# Patient Record
Sex: MALE | Race: WHITE | NOT HISPANIC OR LATINO | Employment: FULL TIME | URBAN - METROPOLITAN AREA
[De-identification: names, ages, dates, MRNs, and addresses within clinical notes are randomized per-mention and may not be internally consistent; named-entity substitution may affect disease eponyms.]

---

## 2018-02-16 ENCOUNTER — OCCUPATIONAL MEDICINE (OUTPATIENT)
Dept: OCCUPATIONAL MEDICINE | Facility: CLINIC | Age: 33
End: 2018-02-16
Payer: COMMERCIAL

## 2018-02-16 VITALS — BODY MASS INDEX: 37.25 KG/M2 | WEIGHT: 275 LBS | RESPIRATION RATE: 14 BRPM | HEIGHT: 72 IN

## 2018-02-16 DIAGNOSIS — S60.419A ABRASION OF MULTIPLE SITES OF RIGHT HAND AND FINGER, INITIAL ENCOUNTER: Primary | ICD-10-CM

## 2018-02-16 DIAGNOSIS — S60.511A ABRASION OF MULTIPLE SITES OF RIGHT HAND AND FINGER, INITIAL ENCOUNTER: Primary | ICD-10-CM

## 2018-02-16 PROCEDURE — 90471 IMMUNIZATION ADMIN: CPT | Performed by: PREVENTIVE MEDICINE

## 2018-02-16 PROCEDURE — 99203 OFFICE O/P NEW LOW 30 MIN: CPT | Mod: 25 | Performed by: PREVENTIVE MEDICINE

## 2018-02-16 PROCEDURE — 90715 TDAP VACCINE 7 YRS/> IM: CPT | Performed by: PREVENTIVE MEDICINE

## 2018-02-16 NOTE — PROGRESS NOTES
Subjective:      Benoit Chiu is a 32 y.o. male who presents with Other (WC DOI 02/16/2018 - Rt Hand - )      DOI 2/16/18: 31 yo male presents for right hand abrasion. He was pulling  door closed and and plastic panel slid against right hand causing deep abrasion to 3 fingers. Patient states there is some bleeding. He states there was a medic at work he wrapped his hand for him. He denies any numbness or tingling. He denies radiating pain. He notes stinging pain that each of the abrasion sites. Does not recall last tetanus vaccine but believes it's been at least 5 years.     HPI    ROS  ROS: All systems were reviewed on intake form, form was reviewed and signed. See scanned documents in media. Pertinent positives and negatives included in HPI.    PMH: No pertinent past medical history to this problem  MEDS: Medications were reviewed in Epic  ALLERGIES: Not on File  SOCHX: Works as a [employer]  at [employer]  FH: No pertinent family history to this problem     Objective:     There were no vitals taken for this visit.     Physical Exam   Constitutional: He is oriented to person, place, and time. He appears well-developed and well-nourished.   HENT:   Right Ear: External ear normal.   Left Ear: External ear normal.   Eyes: Conjunctivae and EOM are normal.   Cardiovascular: Normal rate.    Pulmonary/Chest: Effort normal. No respiratory distress.   Neurological: He is alert and oriented to person, place, and time.   Skin: Skin is warm and dry.   Psychiatric: He has a normal mood and affect. Judgment normal.       Right Hand: Multiple abrasion over the palmar aspect of the hand and digits 2 through 4. Minimal bleeding. No repairable laceration. Full range of motion of all digits and wrists. Sensation intact. Strength intact.       Assessment/Plan:     1. Abrasion of multiple sites of right hand and finger, initial encounter  - Tdap =>8yo IM  - Consent For Tetanus Booster    Clean abrasions of  normal saline. Applied antibiotic ointment. Wrapped with gauze.  Apply thinly ran antibiotic ointment one to 2 times daily. Recommend daily dressing changes. Can leave open to air if no chance of contamination  Keep wound clean, covered and dry at work  Okay for full duty  Follow-up Monday

## 2018-02-16 NOTE — LETTER
[address],   [address] [address]  Phone:  [phone] - Fax:  [phone]   [facility] Progress Report and Disability Certification  Date of Service: 2/16/2018   No Show:  No  Date / Time of Next Visit: 2/20/18 @ 1pm   Claim Information   Patient Name: Benoit Chiu  Claim Number:     Employer:  [employer] Date of Injury: 2/16/2018     Insurer / TPA: [employer] Workers Comp  ID / SSN:     Occupation: Hangar Maintenance  Diagnosis: The encounter diagnosis was Abrasion of multiple sites of right hand and finger, initial encounter.    Medical Information   Related to Industrial Injury? Yes    Subjective Complaints:  DOI 2/16/18: 33 yo male presents for right hand abrasion. He was pulling  door closed and and plastic panel slid against right hand causing deep abrasion to 3 fingers. Patient states there is some bleeding. He states there was a medic at work he wrapped his hand for him. He denies any numbness or tingling. He denies radiating pain. He notes stinging pain that each of the abrasion sites. Does not recall last tetanus vaccine but believes it's been at least 5 years.   Objective Findings: Right Hand: Multiple abrasion over the palmar aspect of the hand and digits 2 through 4. Minimal bleeding. No repairable laceration. Full range of motion of all digits and wrists. Sensation intact. Strength intact.   Pre-Existing Condition(s):     Assessment:   Initial Visit    Status: Additional Care Required  Permanent Disability:No    Plan:      Diagnostics:      Comments:  Clean abrasions of normal saline. Applied antibiotic ointment. Wrapped with gauze.  Apply thinly ran antibiotic ointment one to 2 times daily. Recommend daily dressing changes. Can leave open to air if no chance of contamination  Keep wound clean, co  harleen and dry at work  Okay for full duty  Follow-up Monday for wound check      Disability Information   Status:  Released to Full Duty    From:  2/16/2018  Through: 2/19/2018 Restrictions are:     Physical Restrictions   Sitting:    Standing:    Stooping:    Bending:      Squatting:    Walking:    Climbing:    Pushing:      Pulling:    Other:    Reaching Above Shoulder (L):   Reaching Above Shoulder (R):       Reaching Below Shoulder (L):    Reaching Below Shoulder (R):      Not to exceed Weight Limits   Carrying(hrs):   Weight Limit(lb):   Lifting(hrs):   Weight  Limit(lb):     Comments:      Repetitive Actions   Hands: i.e. Fine Manipulations from Grasping:     Feet: i.e. Operating Foot Controls:     Driving / Operate Machinery:     Physician Name: Rashaun Marcelino D.O. Physician Signature: RASHAUN Little D.O. e-Signature: Dr. Michael Mosqueda, Medical Director   Clinic Name / Location: [address],   [address] [place], [place] [address] [address] Clinic Phone Number: Dept: [phone]   Appointment Time: 3:15 Pm Visit Start Time: 9:56 AM   Check-In Time:  9:53 Am Visit Discharge Time:  10:48 am   Original-Treating Physician or Chiropractor    Page 2-Insurer/TPA    Page 3-Employer    Page 4-Employee

## 2018-02-16 NOTE — LETTER
EMPLOYEE’S CLAIM FOR COMPENSATION/ REPORT OF INITIAL TREATMENT  FORM C-4    EMPLOYEE’S CLAIM - PROVIDE ALL INFORMATION REQUESTED   First Name  Benoit Last Name  Aram Birthdate                    1985                Sex  male Claim Number   Home Address  [address]  Age  32 y.o. Height   Weight   SSN     [facility] Zip  [address] Telephone  [phone] (home)    Mailing Address  [address]  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 (if applicable)  Closing the Hangar Door    How did this injury or occupational disease occur? (Be specific an answer in detail. Use additional sheet if necessary)  I was pulling the hangar door closed, could not see where it dropped and kept pulling the plastic panel removed skin from 3 fingers on my right hand.   If you believe that you have an occupational disease, when did you first have knowledge of the disability and it relationship to your employment?  n/a Witnesses to the Accident  Shoaib Darrell      Nature of Injury or Occupational Disease  Defer  Part(s) of Body Injured or Affected  Hand (R), Finger (R), Defer    I certify that the above is true and correct to the best of my knowledge and that I  have provided this information in order to obtain the benefits of [place]  I hereby authorize any physician, chiropractor, surgeon, practitioner, or other person, any hospital, including [facility] hospital, any medical service organization, any insurance company, or other institution or organization to release to each other, any medical or other information, including benefits paid or payable, pertinent to this injury or disease, except information relative to diagnosis, treatment and/or counseling for AIDS, psychological conditions, alcohol or controlled substances, for which I must give specific authorization.  A Photostat of this authorization shall be as valid as the original.     Date   Place   Employee’s Signature   THIS REPORT MUST BE COMPLETED AND MAILED WITHIN 3 WORKING DAYS OF TREATMENT   Place  [facility]  Name of Facility  [facility]   Date  2/16/2018 Diagnosis  (S60.511A,  S60.419A) Abrasion of multiple sites of right hand and finger, initial encounter  (primary encounter diagnosis) Is there evidence the injured employee was under the influence of alcohol and/or another controlled substance at the time of accident?   Hour  9:56 AM Description of Injury or Disease  The encounter diagnosis was Abrasion of multiple sites of right hand and finger, initial encounter. No   Treatment  Antibiotic ointment and basic wound dressing.   Have you advised the patient to remain off work five days or more? No   X-Ray Findings      If Yes   From Date  To Date      From information given by the employee, together with medical evidence, can you directly connect this injury or occupational disease as job incurred?  Yes If No Full Duty  Yes Modified Duty      Is additional medical care by a physician indicated?  Yes If Modified Duty, Specify any Limitations /  "Restrictions      Do you know of any previous injury or disease contributing to this condition or occupational disease?                            No   Date  2/16/2018 Print Doctor’s Name Rashaun Marcelino D.O. I certify the employer’s copy of  this form was mailed on:   Address  [address] [place] [address],   [address] Insurer’s Use Only     [facility] Zip  [address]    Provider’s Tax ID Number  [id number] Telephone  Dept: [phone]        e-SignTAYLORRASHAUN D.O.   e-Signature: Dr. Michael Mosqueda, Medical Director Degree  DO        ORIGINAL-TREATING PHYSICIAN OR CHIROPRACTOR    PAGE 2-INSURER/TPA    PAGE 3-EMPLOYER    PAGE 4-EMPLOYEE             Form C-4 (rev10/07)              BRIEF DESCRIPTION OF RIGHTS AND BENEFITS  (Pursuant to [place]RS 616C.050)    Notice of Injury or Occupational Disease (Incident Report Form C-1): If an injury or occupational disease (OD) arises out of and in the  course of employment, you must provide written notice to your employer as soon as practicable, but no later than 7 days after the accident or  OD. Your employer shall maintain a sufficient supply of the required forms.    Claim for Compensation (Form C-4): If medical treatment is sought, the form C-4 is available at the place of initial treatment. A completed  \"Claim for Compensation\" (Form C-4) must be filed within 90 days after an accident or OD. The treating physician or chiropractor must,  within 3 working days after treatment, complete and mail to the employer, the employer's insurer and third-party , the Claim for  Compensation.    Medical Treatment: If you require medical treatment for your on-the-job injury or OD, you may be required to select a physician or  chiropractor from a list provided by your workers’ compensation insurer, if it has contracted with an Organization for Managed Care (MCO) or  Preferred Provider Organization (PPO) or providers of health care. If your employer has not entered into a contract with " an MCO or PPO, you  may select a physician or chiropractor from the Panel of Physicians and Chiropractors. Any medical costs related to your industrial injury or  OD will be paid by your insurer.    Temporary Total Disability (TTD): If your doctor has certified that you are unable to work for a period of at least 5 consecutive days, or 5  cumulative days in a 20-day period, or places restrictions on you that your employer does not accommodate, you may be entitled to TTD  compensation.    Temporary Partial Disability (TPD): If the wage you receive upon reemployment is less than the compensation for TTD to which you are  entitled, the insurer may be required to pay you TPD compensation to make up the difference. TPD can only be paid for a maximum of 24  months.    Permanent Partial Disability (PPD): When your medical condition is stable and there is an indication of a PPD as a result of your injury or  OD, within 30 days, your insurer must arrange for an evaluation by a rating physician or chiropractor to determine the degree of your PPD. The  amount of your PPD award depends on the date of injury, the results of the PPD evaluation and your age and wage.    Permanent Total Disability (PTD): If you are medically certified by a treating physician or chiropractor as permanently and totally disabled  and have been granted a PTD status by your insurer, you are entitled to receive monthly benefits not to exceed 66 2/3% of your average  monthly wage. The amount of your PTD payments is subject to reduction if you previously received a PPD award.    Vocational Rehabilitation Services: You may be eligible for vocational rehabilitation services if you are unable to return to the job due to a  permanent physical impairment or permanent restrictions as a result of your injury or occupational disease.    Transportation and Per Ernesto Reimbursement: You may be eligible for travel expenses and per ernesto associated with medical  treatment.    Reopening: You may be able to reopen your claim if your condition worsens after claim closure.    Appeal Process: If you disagree with a written determination issued by the insurer or the insurer does not respond to your request, you may  appeal to the Department of Administration, , by following the instructions contained in your determination letter. You must  appeal the determination within 70 days from the date of the determination letter at [address], [address], [place], [place]  [address], or [address], [address], [place], [place] [address]. If you disagree with the  decision, you may appeal to the  Department of Administration, . You must file your appeal within 30 days from the date of the  decision  letter at [address], [address], [place], [place] [address], or [address], [address], [place], [place] [address]. If you  disagree with a decision of an , you may file a petition for judicial review with the District Court. You must do so within 30  days of the Appeal Officer’s decision. You may be represented by an  at your own expense or you may contact the [facility] for possible  representation.    [place]  for Injured Workers (NAIW): If you disagree with a  decision, you may request that NAIW represent you  without charge at an  Hearing. For information regarding denial of benefits, you may contact the [facility] at: [address], [address], [place] [address], [phone], or [address], [address], [place] [address], [phone]    To File a Complaint with the Division: If you wish to file a complaint with the  of the Division of Industrial Relations (DIR),  please contact the Workers’ Compensation Section, [address], [address], [place], [place] [address], telephone [phone], or  [address], [address], [place], [place] [address], telephone [phone].    For assistance with Workers’ Compensation Issues: you may contact the Office of the Governor Consumer Health Assistance, [address] [place] [address], [address], [place], [place] [address], Toll Free 1-444.977.9797, Web site: http://[employer].[facility].nv., E-mail  [email or web address].[email or web address].                                                                                                                                                                                                                                   __________________________________________________________________                                                                   _________________                Employee Name / Signature                                                                                                                                                       Date                                                                                                                                                                                                     D-2 (rev. 10/07)

## 2018-02-20 ENCOUNTER — OCCUPATIONAL MEDICINE (OUTPATIENT)
Dept: OCCUPATIONAL MEDICINE | Facility: CLINIC | Age: 33
End: 2018-02-20
Payer: COMMERCIAL

## 2018-02-20 VITALS
RESPIRATION RATE: 16 BRPM | HEIGHT: 72 IN | TEMPERATURE: 98.2 F | OXYGEN SATURATION: 94 % | BODY MASS INDEX: 37.25 KG/M2 | WEIGHT: 275 LBS | HEART RATE: 75 BPM

## 2018-02-20 DIAGNOSIS — S60.419A ABRASION OF MULTIPLE SITES OF RIGHT HAND AND FINGER, INITIAL ENCOUNTER: ICD-10-CM

## 2018-02-20 DIAGNOSIS — S60.511A ABRASION OF MULTIPLE SITES OF RIGHT HAND AND FINGER, INITIAL ENCOUNTER: ICD-10-CM

## 2018-02-20 PROCEDURE — 99212 OFFICE O/P EST SF 10 MIN: CPT | Performed by: PREVENTIVE MEDICINE

## 2018-02-20 NOTE — LETTER
[address],   [address] [place] [address]  Phone:  [phone] - Fax:  [phone]   [facility] Progress Report and Disability Certification  Date of Service: 2/20/2018   No Show:  No  Date / Time of Next Visit:  Release from care   Claim Information   Patient Name: Benoit Chiu  Claim Number:     Employer:   [employer]  Date of Injury: 2/16/2018     Insurer / TPA: Meaghan Puri  ID / SSN:     Occupation: Hangar Maintenance  Diagnosis: The encounter diagnosis was Abrasion of multiple sites of right hand and finger, initial encounter.    Medical Information   Related to Industrial Injury? Yes    Subjective Complaints:  DOI 2/16/18: 31 yo male presents for right hand abrasion. He was pulling  door closed and and plastic panel slid against right hand causing deep abrasion to 3 fingers. Patient states the hand is improving. He notes that all the abrasions except for one have pretty much healed.. Notes minimal pain   Objective Findings: Right Hand: Multiple abrasion over the palmar aspect of the hand and digits 2 through 4. Well-healing. No erythema, swelling or drainage.   Pre-Existing Condition(s):     Assessment:   Condition Improved    Status: Discharged /  MMI  Permanent Disability:No    Plan:      Diagnostics:      Comments:  Keep wound clean until completely healed  Release from care  Full duty  Follow-up as needed    Disability Information   Status: Released to Full Duty    From:  2/20/2018  Through:   Restrictions are:     Physical Restrictions   Sitting:    Standing:    Stooping:    Bending:      Squatting:    Walking:    Climbing:    Pushing:      Pulling:    Other:    Reaching Above Shoulder (L):   Reaching Above Shoulder (R):       Reaching Below Shoulder (L):    Reaching Below Shoulder (R):      Not to exceed Weight Limits   Carrying(hrs):   Weight Limit(lb):   Lifting(hrs):   Weight  Limit(lb):     Comments:      Repetitive  Actions   Hands: i.e. Fine Manipulations from Grasping:     Feet: i.e. Operating Foot Controls:     Driving / Operate Machinery:     Physician Name: Rashaun Marcelino D.O. Physician Signature: RASHAUN Little D.O. e-Signature: Dr. Michael Mosqueda, Medical Director   Clinic Name / Location: [address],   [address]  [place] [address] Clinic Phone Number: Dept: [phone]   Appointment Time: 1:00 Pm Visit Start Time: 12:56 PM   Check-In Time:  12:54 Pm Visit Discharge Time:  1:15 PM    Original-Treating Physician or Chiropractor    Page 2-Insurer/TPA    Page 3-Employer    Page 4-Employee

## 2018-02-20 NOTE — PROGRESS NOTES
Subjective:      Benoit Chiu is a 32 y.o. male who presents with Follow-Up (WC DOI 02/16/2018 - Rt Hand - better - room 3)      DOI 2/16/18: 31 yo male presents for right hand abrasion. He was pulling  door closed and and plastic panel slid against right hand causing deep abrasion to 3 fingers. Patient states the hand is improving. He notes that all the abrasions except for one have pretty much healed.. Notes minimal pain     HPI    ROS        Objective:     Pulse 75   Temp 36.8 °C (98.2 °F)   Resp 16   Ht 1.829 m (6')   Wt 124.7 kg (275 lb)   SpO2 94%   BMI 37.30 kg/m²      Physical Exam    Right Hand: Multiple abrasion over the palmar aspect of the hand and digits 2 through 4. Well-healing. No erythema, swelling or drainage.       Assessment/Plan:     1. Abrasion of multiple sites of right hand and finger, initial encounter  Keep wound clean until completely healed  Release from care  Full duty  Follow-up as needed

## 2020-04-24 ENCOUNTER — HOSPITAL ENCOUNTER (EMERGENCY)
Facility: MEDICAL CENTER | Age: 35
End: 2020-04-25
Attending: EMERGENCY MEDICINE
Payer: COMMERCIAL

## 2020-04-24 DIAGNOSIS — Z72.89 SELF-MUTILATION: ICD-10-CM

## 2020-04-24 DIAGNOSIS — S01.81XA FACIAL LACERATION, INITIAL ENCOUNTER: ICD-10-CM

## 2020-04-24 DIAGNOSIS — F41.9 SEVERE ANXIETY: ICD-10-CM

## 2020-04-24 LAB
ETHANOL BLD-MCNC: <10.1 MG/DL (ref 0–10.1)
POC BREATHALIZER: NORMAL PERCENT (ref 0–0.01)

## 2020-04-24 PROCEDURE — 302970 POC BREATHALIZER: Performed by: EMERGENCY MEDICINE

## 2020-04-24 PROCEDURE — 80307 DRUG TEST PRSMV CHEM ANLYZR: CPT

## 2020-04-24 PROCEDURE — 700101 HCHG RX REV CODE 250

## 2020-04-24 PROCEDURE — 303747 HCHG EXTRA SUTURE

## 2020-04-24 PROCEDURE — 99284 EMERGENCY DEPT VISIT MOD MDM: CPT

## 2020-04-24 PROCEDURE — 304999 HCHG REPAIR-SIMPLE/INTERMED LEVEL 1

## 2020-04-24 PROCEDURE — 36415 COLL VENOUS BLD VENIPUNCTURE: CPT

## 2020-04-24 PROCEDURE — 304217 HCHG IRRIGATION SYSTEM

## 2020-04-24 RX ORDER — LIDOCAINE HYDROCHLORIDE AND EPINEPHRINE BITARTRATE 20; .01 MG/ML; MG/ML
10 INJECTION, SOLUTION SUBCUTANEOUS ONCE
Status: COMPLETED | OUTPATIENT
Start: 2020-04-24 | End: 2020-04-24

## 2020-04-24 RX ORDER — LIDOCAINE HYDROCHLORIDE AND EPINEPHRINE BITARTRATE 20; .01 MG/ML; MG/ML
INJECTION, SOLUTION SUBCUTANEOUS
Status: COMPLETED
Start: 2020-04-24 | End: 2020-04-24

## 2020-04-24 RX ADMIN — LIDOCAINE HYDROCHLORIDE AND EPINEPHRINE BITARTRATE 10 ML: 20; .01 INJECTION, SOLUTION SUBCUTANEOUS at 22:30

## 2020-04-24 RX ADMIN — LIDOCAINE HYDROCHLORIDE AND EPINEPHRINE 10 ML: 20; 10 INJECTION, SOLUTION INFILTRATION; PERINEURAL at 22:30

## 2020-04-24 SDOH — HEALTH STABILITY: MENTAL HEALTH: HOW OFTEN DO YOU HAVE A DRINK CONTAINING ALCOHOL?: NOT ASKED

## 2020-04-24 NOTE — LETTER
FORM C-4:  EMPLOYEE’S CLAIM FOR COMPENSATION/ REPORT OF INITIAL TREATMENT  EMPLOYEE’S CLAIM - PROVIDE ALL INFORMATION REQUESTED   First Name Benoit Last Name Aram Birthdate 1985  Sex male Claim Number   Home Employee Address [address]  [facility]                                     Zip  [address] Height  1.829 m (6') Weight  110 kg (242 lb 8.1 oz) [facility]  [id number]  [phone]  [id number]   Mailing Employee Address [address] [facility] [facility] [address]               Zip  [address] Telephone  [phone] (home)  Primary Language Spoken   Insurer  [employer]   ESIS Employee's Occupation (Job Title) When Injury or Occupational Disease Occurred     Employer's Name [email or web address] Telephone [phone]    Employer Address [address]  [facility] [place] [address]] Zip [address]   Date of Injury  4/24/2020       Hour of Injury  7:30 PM Date Employer Notified  4/24/2020 Last Day of Work after Injury or Occupational Disease  4/24/2020 Supervisor to Whom Injury Reported  Ld Tomlin   Address or Location of Accident (if applicable) [[id number] ]   What were you doing at the time of accident? (if applicable) working on Phone issue    How did this injury or occupational disease occur? Be specific and answer in detail. Use additional sheet if necessary)  I got overwhelmed at work and I punched myself in the right temple.   If you believe that you have an occupational disease, when did you first have knowledge of the disability and it relationship to your employment?  Witnesses to the Accident  No   Nature of Injury or Occupational Disease  Workers' Compensation Part(s) of Body Injured or Affected  Skull, Soft Tissue   I CERTIFY THAT THE ABOVE IS TRUE AND CORRECT TO THE BEST OF MY KNOWLEDGE AND THAT I HAVE PROVIDED THIS INFORMATION IN ORDER TO OBTAIN THE BENEFITS OF [place] INCLUSIVE OR  CHAPTER 617 OF [place]).  I HEREBY AUTHORIZE ANY PHYSICIAN, CHIROPRACTOR, SURGEON, PRACTITIONER, OR OTHER PERSON, ANY HOSPITAL, INCLUDING [facility] OR [facility], ANY MEDICAL SERVICE ORGANIZATION, ANY INSURANCE COMPANY, OR OTHER INSTITUTION OR ORGANIZATION TO RELEASE TO EACH OTHER, ANY MEDICAL OR OTHER INFORMATION, INCLUDING BENEFITS PAID OR PAYABLE, PERTINENT TO THIS INJURY OR DISEASE, EXCEPT INFORMATION RELATIVE TO DIAGNOSIS, TREATMENT AND/OR COUNSELING FOR AIDS, PSYCHOLOGICAL CONDITIONS, ALCOHOL OR CONTROLLED SUBSTANCES, FOR WHICH I MUST GIVE SPECIFIC AUTHORIZATION.  A PHOTOSTAT OF THIS AUTHORIZATION SHALL BE AS VALID AS THE ORIGINAL.  Date  04/25/2020                         Place  [facility]                                                                           Employee’s Signature   THIS REPORT MUST BE COMPLETED AND MAILED WITHIN 3 WORKING DAYS OF TREATMENT   Place [facility], EMERGENCY DEPT                                                                             Name of Facility [facility]   Date  4/24/2020 Diagnosis  (S01.81XA) Facial laceration, initial encounter  (F41.9) Severe anxiety  (Z72.89) Self-mutilation Is there evidence the injured employee was under the influence of alcohol and/or another controlled substance at the time of accident?   Hour  12:15 AM Description of Injury or Disease  Facial laceration, initial encounter  Severe anxiety  Self-mutilation No   Treatment  Patient was evaluated for laceration that was self-inflicted in his right orbital region.  The wound was cleansed with Betadine and saline, lidocaine with epinephrine was used for local anesthesia 5-0 Ethilon times running suture was used for repair.  Patient tolerated procedure well.  Sutures to be taken out in 3 to 5 days.  He is to ice for 24 hours, Tylenol for pain.  Behavioral health consult was also obtained for the patient's severe anxiety and he will follow-up  "outpatient.  Have you advised the patient to remain off work five days or more?         No   X-Ray Findings    If Yes   From Date    To Date      From information given by the employee, together with medical evidence, can you directly connect this injury or occupational disease as job incurred? No If No, is employee capable of: Full Duty  Yes Modified Duty      Is additional medical care by a physician indicated? Yes If Modified Duty, Specify any Limitations / Restrictions       Do you know of any previous injury or disease contributing to this condition or occupational disease? No    Date 4/25/2020 Print Doctor’s Name Blanca Santillan certify the employer’s copy of this form was mailed on:   Address [address] [place] [id number]  [phone] INSURER’S USE ONLY   Provider’s Tax ID Number   Telephone Dept: [phone]    Doctor’s Signature e-SignBLANCA SANTILLAN D.O. Degree MD      Form C-4 (rev.10/07)                                                          BRIEF DESCRIPTION OF RIGHTS AND BENEFITS  (Pursuant to [place] 616C.050)    Notice of Injury or Occupational Disease (Incident Report Form C-1): If an injury or occupational disease (OD) arises out of and in the course of employment, you must provide written notice to your employer as soon as practicable, but no later than 7 days after the accident or OD. Your employer shall maintain a sufficient supply of the required forms.    Claim for Compensation (Form C-4): If medical treatment is sought, the form C-4 is available at the place of initial treatment. A completed \"Claim for Compensation\" (Form C-4) must be filed within 90 days after an accident or OD. The treating physician or chiropractor must, within 3 working days after treatment, complete and mail to the employer, the employer's insurer and third-party , the Claim for Compensation.    Medical Treatment: If you require medical treatment for your on-the-job injury or OD, you may be required " to select a physician or chiropractor from a list provided by your workers’ compensation insurer, if it has contracted with an Organization for Managed Care (MCO) or Preferred Provider Organization (PPO) or providers of health care. If your employer has not entered into a contract with an MCO or PPO, you may select a physician or chiropractor from the Panel of Physicians and Chiropractors. Any medical costs related to your industrial injury or OD will be paid by your insurer.    Temporary Total Disability (TTD): If your doctor has certified that you are unable to work for a period of at least 5 consecutive days, or 5 cumulative days in a 20-day period, or places restrictions on you that your employer does not accommodate, you may be entitled to TTD compensation.    Temporary Partial Disability (TPD): If the wage you receive upon reemployment is less than the compensation for TTD to which you are entitled, the insurer may be required to pay you TPD compensation to make up the difference. TPD can only be paid for a maximum of 24 months.    Permanent Partial Disability (PPD): When your medical condition is stable and there is an indication of a PPD as a result of your injury or OD, within 30 days, your insurer must arrange for an evaluation by a rating physician or chiropractor to determine the degree of your PPD. The amount of your PPD award depends on the date of injury, the results of the PPD evaluation and your age and wage.    Permanent Total Disability (PTD): If you are medically certified by a treating physician or chiropractor as permanently and totally disabled and have been granted a PTD status by your insurer, you are entitled to receive monthly benefits not to exceed 66 2/3% of your average monthly wage. The amount of your PTD payments is subject to reduction if you previously received a PPD award.    Vocational Rehabilitation Services: You may be eligible for vocational rehabilitation services if you are  unable to return to the job due to a permanent physical impairment or permanent restrictions as a result of your injury or occupational disease.    Transportation and Per Ernesto Reimbursement: You may be eligible for travel expenses and per ernesto associated with medical treatment.    Reopening: You may be able to reopen your claim if your condition worsens after claim closure.     Appeal Process: If you disagree with a written determination issued by the insurer or the insurer does not respond to your request, you may appeal to the Department of Administration, , by following the instructions contained in your determination letter. You must appeal the determination within 70 days from the date of the determination letter at [address], [address], [place], [place] [address], or [address], [place], [place] [address]. If you disagree with the  decision, you may appeal to the Department of Administration, . You must file your appeal within 30 days from the date of the  decision letter at [address], [address], [place], [place] [address], or [address] [facility] [address], [place] [address]. If you disagree with a decision of an , you may file a petition for judicial review with the District Court. You must do so within 30 days of the Appeal Officer’s decision. You may be represented by an  at your own expense or you may contact the [facility] for possible representation.    [place]  for Injured Workers (NAIW): If you disagree with a  decision, you may request that NAIW represent you without charge at an  Hearing. For information regarding denial of benefits, you may contact the [facility] at: [address] [facility] [facility], [address] [address], [phone], or [address] [facility] [address], [place] [address], [phone]    To File a Complaint with the  Division: If you wish to file a complaint with the  of the Division of Industrial Relations (DIR),  please contact the Workers’ Compensation Section, [address], [address], [place], [place] [address], telephone [phone], or [address], [address], [place], [place] [address], telephone [phone].    For assistance with Workers’ Compensation Issues: You may contact the Office of the Governor Consumer Health Assistance, [address] [place] [address], [address], [place], [place] [address], Toll Free 1-746.382.8380, Web site: [email or web address].[place].us, E-mail [email or web address].nv.  D-2 (rev. 06/18)              __________________________________________________________________                                    _____04/25/2020____________            Employee Name / Signature                                                                                                                            Date

## 2020-04-25 VITALS
RESPIRATION RATE: 16 BRPM | HEIGHT: 72 IN | OXYGEN SATURATION: 98 % | DIASTOLIC BLOOD PRESSURE: 84 MMHG | BODY MASS INDEX: 32.85 KG/M2 | HEART RATE: 63 BPM | WEIGHT: 242.51 LBS | TEMPERATURE: 98.2 F | SYSTOLIC BLOOD PRESSURE: 135 MMHG

## 2020-04-25 LAB
AMPHET UR QL SCN: NEGATIVE
BARBITURATES UR QL SCN: NEGATIVE
BENZODIAZ UR QL SCN: NEGATIVE
BZE UR QL SCN: NEGATIVE
CANNABINOIDS UR QL SCN: POSITIVE
METHADONE UR QL SCN: NEGATIVE
OPIATES UR QL SCN: NEGATIVE
OXYCODONE UR QL SCN: NEGATIVE
PCP UR QL SCN: NEGATIVE
PROPOXYPH UR QL SCN: NEGATIVE

## 2020-04-25 PROCEDURE — 90791 PSYCH DIAGNOSTIC EVALUATION: CPT

## 2020-04-25 NOTE — ED PROVIDER NOTES
ED Provider Note     Scribed for Blanca Santillan D.O. by Elle Swartz. 4/24/2020, 10:13 PM.     Primary care provider: None Noted  Means of arrival: Walk-In         History obtained from: Patient  History limited by: None     CHIEF COMPLAINT  Chief Complaint   Patient presents with   • Head Laceration     pt has hx of anxiety, was at work and feeling upset and punched himself in the head, sustained laceration to the right upper eye. pt states he usually hits himself in the head, but has never caused an injury as such before. denies any SI/HI       HPI  Benoit Chiu is a 34 y.o. male who presents to the emergency Department for right sided orbital laceration onset earlier today after punching himself in the head with work gloves on. Bleeding is controlled. Denies loss of consciousness or other injuries. He reports that he regularly punches himself in the head when frustrated due to struggling with anxiety. However, this is the first time he has had to come to the ED due to sustaining an injury. He has been under psychiatric care at the [facility], but does not currently go to outpatient care. Denies SI or HI. Negative for cough, fever, shortness of breath, or chest pain.     REVIEW OF SYSTEMS  Pertinent positives include head laceration and anxiety. Pertinent negatives include no HI, SI, cough, fever, shortness of breath, or chest pain.   See HPI for further details. All other systems are negative.    PAST MEDICAL HISTORY  Past Medical History:   Diagnosis Date   • Anxiety        FAMILY HISTORY  History reviewed. No pertinent family history.    SOCIAL HISTORY  Social History     Tobacco Use   • Smoking status: Never Smoker   • Smokeless tobacco: Never Used   Substance Use Topics   • Alcohol use: Not Currently   • Drug use: Never      Social History     Substance and Sexual Activity   Drug Use Never       SURGICAL HISTORY  History reviewed. No pertinent surgical history.    CURRENT MEDICATIONS  No current  outpatient medications.     ALLERGIES  No Known Allergies    PHYSICAL EXAM  VITAL SIGNS: /84   Pulse (!) 58   Temp 36.3 °C (97.4 °F) (Temporal)   Resp 16   Ht 1.829 m (6')   Wt 110 kg (242 lb 8.1 oz)   SpO2 96%   BMI 32.89 kg/m²     Constitutional: Patient is well developed, well nourished.  Mild distress from his laceration, extremely anxious.  HENT: 2.5 cm head laceration to right lateral orbital wall laceration extends through the dermis with no foreign bodies or bony deformities.  Normocephalic. Bilateral external auditory canals normal without drainage or cerumen. Nose normal with no drainage. Oropharynx moist.  Eyes: PERRL, EOMI, Conjunctiva without erythema.  Cardiovascular: Normal heart rate and Regular rhythm. No murmur  Thorax & Lungs: Clear and equal breath sounds with good excursion. No respiratory distress.  Abdomen: Bowel sounds normal in all four quadrants. Soft,nontender.   Skin: No contusions, abrasions, rashes.  Musculoskeletal: Normal range of motion in all major joints. No tenderness to palpation or major deformities noted.   Neurologic: Alert & oriented x 3, Normal motor function, Normal sensory function, No lateralizing or focal deficits noted. DTR's 4/4 bilaterally.  Psychiatric: Affect odd, Judgment normal, Mood very anxious.    DIAGNOSTICS/PROCEDURES    LABS    Results for orders placed or performed during the hospital encounter of 04/24/20   DIAGNOSTIC ALCOHOL   Result Value Ref Range    Diagnostic Alcohol <10.1 0.0 - 10.1 mg/dL   POC BREATHALIZER   Result Value Ref Range    POC Breathalizer  0.00 - 0.01 Percent      All labs reviewed by me.     Laceration Repair Procedure Note    Indication: Laceration    Procedure: The patient was placed in the appropriate position and anesthesia around the laceration was obtained by infiltration using 2% Lidocaine with epinephrine. The area was then cleansed with betadine and draped in a sterile fashion. The laceration was closed with 5-0  Ethilon using running sutures. There were no additional lacerations requiring repair.    Total repaired wound length: 2.5 cm.     Other Items: Suture count: 8    The patient tolerated the procedure well.    Complications: None    COURSE & MEDICAL DECISION MAKING  Pertinent Labs & Imaging studies reviewed. (See chart for details)    10:13 PM - Patient seen and evaluated at bedside. Patient presents with 2 cm head laceration to the right lateral orbital wall. Discussed plan of care including laceration repair. Ordered diagnostic alcohol. He understands and agrees to plan of care. Differential diagnoses include, but are not limited to, laceration.     10:18 PM - Performed laceration repair at this time. Procedure notes shown above. Informed him that he will consult with [facility] prior to discharge. He agrees to plan.   Blood alcohol level was less than 10, urine drug screen was negative.  Patient was given referral numbers for outpatient counseling and felt much better.  He was given alternative resources for anxiety.    The patient will return for new or worsening symptoms and is stable at the time of discharge.      DISPOSITION:  Patient will be discharged home in stable condition.    FOLLOW UP:  [facility]  [address] [facility] [id number] [phone]  In 5 days  For suture removal      FINAL IMPRESSION  1. Facial laceration, initial encounter    2. Severe anxiety    3. Self-mutilation         Elle GUY (Sarahiblev), am scribing for, and in the presence of, Blanca Santillan D.O..    Electronically signed by: Elle Swartz (Jenny), 4/24/2020    Blanca GUY D.O. personally performed the services described in this documentation, as scribed by Elle Swartz in my presence, and it is both accurate and complete. C    The note accurately reflects work and decisions made by me.  Blanca Santillan D.O.  4/25/2020  1:42 AM

## 2020-04-25 NOTE — CONSULTS
[facility]   TRIAGE ASSESSMENT    Name: Benoit Chiu  MRN: [id number]  : 1985  Age: 34 y.o.  Date of assessment: 2020  PCP: Pcp Pt States None  Persons in attendance: Patient    CHIEF COMPLAINT/PRESENTING ISSUE (as stated by Benoit Chiu): The patient presents as a 34 year-old male, ambulating to triage reporting that he punched himself in the head after having an anxiety attack while at work due to frustration. In speaking to this writer the patient acknowledges that he has done this since the age of 17 but is now seeking additional supports to manage his symptoms and accompanying anger; the patient denies SI and HI and reports no history of previous suicide attempts or additional self-harm methods. He denies AH/VH; Patient alert, oriented, and open to feedback throughout the duration of the encounter. The patient is not taking any medications at this time; this writer discussed utilizing several different coping skills to replace self-harm, which the patient noted he would utilize in the future.This writer also provided the patient with resources for outpatient therapy providers in the local area, which he accepted and noted he would also use upon discharge. The patient presents with no additional mental health concerns and has no safety concerns.   Chief Complaint   Patient presents with   • Head Laceration     pt has hx of anxiety, was at work and feeling upset and punched himself in the head, sustained laceration to the right upper eye. pt states he usually hits himself in the head, but has never caused an injury as such before. denies any SI/HI        CURRENT LIVING SITUATION/SOCIAL SUPPORT: The patient lives with his fiancee and her mother in a shared family residence. The patient reports no financial concerns although he does state that his job has been stressful lately, but they are planning on moving out of state in the next couple of months which he notes he is looking forward  to. The patient is well supported by both family and friends.     BEHAVIORAL HEALTH TREATMENT HISTORY  Does patient/parent report a history of prior behavioral health treatment for patient?   Yes:    Dates Level of Care Facilty/Provider Diagnosis/Problem Medications   [facility] (Therapist Juhi Alfaro) Adjustment DO None         SAFETY ASSESSMENT - SELF  Does patient acknowledge current or past symptoms of dangerousness to self? Yes, pt admits to history of occasionally hitting himself in the head using his hands. He reports no other cases of self-harm or intentional injury.  Does parent/significant other report patient has current or past symptoms of dangerousness to self? N\A  Does presenting problem suggest symptoms of dangerousness to self? No    SAFETY ASSESSMENT - OTHERS  Does patient acknowledge current or past symptoms of aggressive behavior or risk to others? no  Does parent/significant other report patient has current or past symptoms of aggressive behavior or risk to others?  N\A  Does presenting problem suggest symptoms of dangerousness to others? No    Crisis Safety Plan completed and copy given to patient? N\A    ABUSE/NEGLECT SCREENING  Does patient report feeling “unsafe” in his/her home, or afraid of anyone?  no  Does patient report any history of physical, sexual, or emotional abuse?  no  Does parent or significant other report any of the above? N\A  Is there evidence of neglect by self?  no  Is there evidence of neglect by a caregiver? no  Does the patient/parent report any history of CPS/APS/police involvement related to suspected abuse/neglect or domestic violence? no  Based on the information provided during the current assessment, is a mandated report of suspected abuse/neglect being made?  No    SUBSTANCE USE SCREENING  Yes:  Sea all substances used in the past 30 days:      Last Use Amount   []   Alcohol     []   Marijuana     []   Heroin     []   Prescription Opioids  (used  "without prescription, for    recreation, or in excess of prescribed amount)     []   Other Prescription  (used without prescription, for    recreation, or in excess of prescribed amount)     []   Cocaine      []   Methamphetamine     []   \"\" drugs (ectasy, MDMA)     []   Other substances        UDS results: THC  Breathalyzer results: 0.01    What consequences does the patient associate with any of the above substance use and or addictive behaviors? None    Risk factors for detox (check all that apply):  []  Seizures   []  Diaphoretic (sweating)   []  Tremors   []  Hallucinations   []  Increased blood pressure   []  Decreased blood pressure   []  Other   []  None      [] Patient education on risk factors for detoxification and instructed to return to ER as needed.      MENTAL STATUS   Participation: Active verbal participation, Attentive, Engaged and Open to feedback  Grooming: Good  Orientation: Alert and Fully Oriented  Behavior: Calm  Eye contact: Good  Mood: Euthymic  Affect: Full range and Congruent with content  Thought process: Logical and Goal-directed  Thought content: Within normal limits  Speech: Rate within normal limits and Volume within normal limits  Perception: Within normal limits  Memory:  No gross evidence of memory deficits  Insight: Good  Judgment:  Adequate  Other:    Collateral information:   Source:  [] Significant other present in person:   [] Significant other by telephone  [] [facility]   [x] [facility] Nursing Staff  [x] [facility] Medical Record  [] Other:       CLINICAL IMPRESSIONS:  Primary: R/O for Adjustment DO  Secondary:        IDENTIFIED NEEDS/PLAN:  [Trigger DISPOSITION list for any items marked]    []  Imminent safety risk - self [] Imminent safety risk - others   []  Acute substance withdrawal []  Psychosis/Impaired reality testing   [x]  Mood/anxiety []  Substance use/Addictive behavior   [x]  Maladaptive behaviro []  Parent/child conflict   []  Family/Couples conflict " []  Biomedical   []  Housing []  Financial   []   Legal  Occupational/Educational   []  Domestic violence []  Other:     Disposition: Consulted with Dr. Santillan; at this time the patient presents with no safety concerns and will be safe to discarge to home; patient provided with resources for outpatient therapy; useful coping skills also discussed. Patient agreeable to discharge plan and reports no additional concerns.     Does patient express agreement with the above plan? yes    Referral appointment(s) scheduled? no    Alert team only:   I have discussed findings and recommendations with Dr. Santillan who is in agreement with these recommendations.       HOLDEN Fatima  4/25/2020

## 2020-04-25 NOTE — DISCHARGE INSTRUCTIONS
Clean your laceration daily with 50/50 hydrogen peroxide and water solution, pat dry and apply thin layer of Neosporin for the first 3 days only.  Sutures out in 5 days.  Follow-up outpatient with the resources that are behavioral health nurse gave you.  Return if any signs of infection.

## 2020-04-25 NOTE — ED TRIAGE NOTES
Benoit Chiu   34 y.o. male   Chief Complaint   Patient presents with   • Head Laceration     pt has hx of anxiety, was at work and feeling upset and punched himself in the head, sustained laceration to the right upper eye. pt states he usually hits himself in the head, but has never caused an injury as such before. denies any SI/HI      Pt amb to triage with steady gait for above complaint. Bandage placed over thee right eye. He denies any thoughts of SI or HI. Reports that his injury was unintentional.    Pt is alert and oriented, speaking in full sentences, follows commands and responds appropriately to questions. NAD. Resp are even and unlabored.   Pt placed in lobby. Pt educated on triage process. Pt encouraged to alert staff for any changes.    /84   Pulse (!) 58   Temp 36.3 °C (97.4 °F) (Temporal)   Resp 16   Ht 1.829 m (6')   Wt 110 kg (242 lb 8.1 oz)   SpO2 96%   BMI 32.89 kg/m²

## 2020-04-25 NOTE — DISCHARGE PLANNING
Alert Team    Alert Team aware of pending mental health consultation; currently awaiting diagnostic alcohol results, will evaluate patient ASAP once results arrive and he is deemed legally sober.